# Patient Record
Sex: MALE | Race: WHITE
[De-identification: names, ages, dates, MRNs, and addresses within clinical notes are randomized per-mention and may not be internally consistent; named-entity substitution may affect disease eponyms.]

---

## 2018-05-10 ENCOUNTER — HOSPITAL ENCOUNTER (EMERGENCY)
Dept: HOSPITAL 17 - PHEFT | Age: 23
Discharge: HOME | End: 2018-05-10
Payer: MEDICAID

## 2018-05-10 VITALS
SYSTOLIC BLOOD PRESSURE: 129 MMHG | RESPIRATION RATE: 18 BRPM | HEART RATE: 77 BPM | DIASTOLIC BLOOD PRESSURE: 84 MMHG | OXYGEN SATURATION: 99 % | TEMPERATURE: 97.9 F

## 2018-05-10 VITALS — HEIGHT: 69 IN | WEIGHT: 180.78 LBS | BODY MASS INDEX: 26.78 KG/M2
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
## 2018-05-10 DIAGNOSIS — S93.115A: Primary | ICD-10-CM

## 2018-05-10 PROCEDURE — 73630 X-RAY EXAM OF FOOT: CPT

## 2018-05-10 PROCEDURE — 99283 EMERGENCY DEPT VISIT LOW MDM: CPT

## 2018-05-10 PROCEDURE — L3260 AMBULATORY SURGICAL BOOT EAC: HCPCS

## 2018-05-10 PROCEDURE — 73620 X-RAY EXAM OF FOOT: CPT

## 2018-05-10 NOTE — RADRPT
EXAM DATE/TIME:  05/10/2018 11:54 

 

[facility] COMPARISON:     

No previous studies available for comparison.

 

                     

INDICATIONS :     

Injuried 2nd digit at karate last night.

                     

 

MEDICAL HISTORY :     

None.          

 

SURGICAL HISTORY :     

None.   

 

ENCOUNTER:     

Initial                                        

 

ACUITY:     

1 day      

 

PAIN SCORE:     

8/10

 

LOCATION:     

Left  Foot

 

FINDINGS:     

There is subluxation/dislocation at the second PIP joint with the middle phalanx displaced medially a
nd dorsally. No fracture is seen.

 

CONCLUSION:     

Subluxation/dislocation at the second PIP joint.

 

 

 

 Holger Soto MD on May 10, 2018 at 12:27           

Board Certified Radiologist.

 This report was verified electronically.

## 2018-05-10 NOTE — PD
HPI


Chief Complaint:  Injury


Time Seen by Provider:  11:26


Travel History


International Travel<30 days:  No


Contact w/Intl Traveler<30days:  No


Traveled to known affect area:  No





History of Present Illness


HPI


22-year-old male presents emergency department for evaluation of left second 

toe pain after a lateral distraction injury that occurred yesterday while 

practicing gustavo.  Patient states that he had his toe caught in a sleeve and 

resulted in lateral displacement of his toe.  Patient states that he is having 

a difficult time walking because of the pain.  Pain is mild to moderate in 

severity, nonradiating, aching.  Patient points to the middle phalanges of the 

second toe where he is having pain.  Denies numbness tingling.  Says he is able 

to move the toe however this is painful.  He denies chronic medical issues 

medication use.  Patient does not smoke.





PFSH


Past Medical History


Medical History:  Denies Significant Hx


ADHD:  No


Cancer:  No


Cardiovascular Problems:  No


Diabetes:  No


Diminished Hearing:  No


Psychiatric:  Yes (ANGER ISSUES.)


Migraines:  Yes


Seizures:  No


Thyroid Disease:  No


Ulcer:  No


Tetanus Vaccination:  Unknown


Pregnant?:  Not Pregnant





Past Surgical History


Surgical History:  No Previous Surgery


Appendectomy:  No


Cholecystectomy:  No


Other Surgery:  No





Social History


Alcohol Use:  Yes (OCC)


Tobacco Use:  No


Substance Use:  Yes





Allergies-Medications


(Allergen,Severity, Reaction):  


Coded Allergies:  


     No Known Allergies (Verified  Adverse Reaction, Unknown, 5/10/18)


Reported Meds & Prescriptions





Reported Meds & Active Scripts


Active


No Active Prescriptions or Reported Medications    








Review of Systems


Except as stated in HPI:  all other systems reviewed are Neg





Physical Exam


Narrative


GENERAL: Well-nourished, well-developed patient.


SKIN: Focused skin assessment warm/dry.


HEAD: Normocephalic.


EYES: No scleral icterus. No injection or drainage. 


NECK: Supple, trachea midline. No JVD or lymphadenopathy.


CARDIOVASCULAR: Regular rate and rhythm without murmurs, gallops, or rubs. 


RESPIRATORY: Breath sounds equal bilaterally. No accessory muscle use.


MUSCULOSKELETAL: No cyanosis, or edema. 


Left foot-tenderness palpation to the left second toe MTP and distal.  

Ecchymosis present about the middle phalanges with accompanying tenderness to 

palpation.  Neurovascular intact.  No crepitus noted.  There is some medial 

displacement of the toe.


BACK: Nontender without obvious deformity. No CVA tenderness.





Data


Data


Last Documented VS





Vital Signs








  Date Time  Temp Pulse Resp B/P (MAP) Pulse Ox O2 Delivery O2 Flow Rate FiO2


 


5/10/18 11:16 97.9 77 18 129/84 (99) 99   








Orders





 Orders


Foot, Complete ([id number]) (5/10/18 )


Acetamin-Hydrocod 325-5 Mg (Norco  5-325 (5/10/18 13:00)


Foot, One View (5/10/18 )


Support Splint (5/10/18 14:06)


Mandatory Outpatient Referral (5/10/18 14:10)


Ed Discharge Order (5/10/18 14:10)








MDM


Medical Decision Making


Medical Screen Exam Complete:  Yes


Emergency Medical Condition:  Yes


Differential Diagnosis


Left second toe bursitis, cellulitis, fracture, osteonecrosis, sprain, strain


Narrative Course


22-year-old male presents emergency department for evaluation of left second 

toe pain after a lateral distraction injury that occurred yesterday while 

practicing gustavo.  Patient states that he had his toe caught in a sleeve and 

resulted in lateral displacement of his toe.  Patient states that he is having 

a difficult time walking because of the pain.  Pain is mild to moderate in 

severity, nonradiating, aching.  Patient points to the middle phalanges of the 

second toe where he is having pain.  Denies numbness tingling.  Says he is able 

to move the toe however this is painful.  He denies chronic medical issues 

medication use.  Patient does not smoke.


Vital signs are stable.


Physical exam findings consistent with a left second toe fracture about the 

middle phalanges versus sprain versus strain.  Is neurovascularly intact.


Hydrocodone for pain.


Last Impressions








Foot X-Ray 5/10/18 0000 Signed





Impressions: 





 Service Date/Time:  Thursday, May 10, 2018 12:56 - CONCLUSION:  1. Persistent 





 mild subluxation/dislocation of the second PIP joint. No fracture.     Yoel Bañuelos MD 


 


Foot X-Ray 5/10/18 0000 Signed





Impressions: 





 Service Date/Time:  Thursday, May 10, 2018 11:54 - CONCLUSION:  





 Subluxation/dislocation at the second PIP joint.     Holger Soto MD 





After the first x-ray, the second toe was kandi taped to the third toe.  A 

repeat x-ray showed persistent subluxation.


I placed a call to Dr. George,.  She advised to keep the kandi tape and paste 

and postop shoe.  Says that she will see this patient tomorrow at 12:30 PM in 

her office.


I gave the patient her office information advised that he follow-up and arrive 

at 12p.


Patient states understanding will comply.





Diagnosis





 Primary Impression:  


 Toe dislocation


 Qualified Codes:  S93.105A - Unspecified dislocation of left toe(s), initial 

encounter


Referrals:  


Podiatrist





Primary Care Physician


Departure Forms:  Tests/Procedures, Work Release   Enter return to work date:  

May 14, 2018





   Special Instructions:  Avoid excessive walking or pressure on toe.





***Additional Instructions:  


You have an appointment with Dr. George, tomorrow at 12:30 PM.  I recommend 

you arrive at 12 PM.  Her office is located at [address] [place].  They [place] [facility] [place] [facility] in a brick 

building.  Their phone number is [phone]. 


Use ice or heat for symptom relief. If no contraindications, you may use 

Tylenol or Motrin per package instructions for your pain.


Elevate the joint above the heart to reduce swelling.


You may use compression with Ace wrap or similar to reduce swelling.


If symptoms persist or worsen, return to the emergency department.


Follow up with your primary care physician within 2 days.


Scripts


No Active Prescriptions or Reported Meds


Disposition:  01 DISCHARGE HOME


Condition:  Stable











Dorinda Shields May 10, 2018 11:39

## 2018-05-10 NOTE — RADRPT
EXAM DATE/TIME:  05/10/2018 12:56 

 

[facility] COMPARISON:     

FOOT LEFT COMPLETE ([id number]), May 10, 2018, 11:54.

 

                     

INDICATIONS :     

Post reduction left 2nd digit.

                     

 

MEDICAL HISTORY :     

None.          

 

SURGICAL HISTORY :     

None.   

 

ENCOUNTER:     

Subsequent                                        

 

ACUITY:     

1 day      

 

PAIN SCORE:     

7/10

 

LOCATION:     

Left  2nd digit

 

FINDINGS:     

Single view of the left foot again demonstrates persistent mild subluxation/dislocation of the second
 PIP joint. Osseous structures are intact without acute bony fracture.

 

CONCLUSION:     

1. Persistent mild subluxation/dislocation of the second PIP joint. No fracture.

 

 

 

 Yoel Bañuelos MD on May 10, 2018 at 13:36           

Board Certified Radiologist.

 This report was verified electronically.